# Patient Record
Sex: FEMALE | Race: WHITE | URBAN - METROPOLITAN AREA
[De-identification: names, ages, dates, MRNs, and addresses within clinical notes are randomized per-mention and may not be internally consistent; named-entity substitution may affect disease eponyms.]

---

## 2021-01-25 ENCOUNTER — TELEPHONE (OUTPATIENT)
Dept: GASTROENTEROLOGY | Age: 71
End: 2021-01-25

## 2021-02-02 ENCOUNTER — TELEPHONE (OUTPATIENT)
Dept: GASTROENTEROLOGY | Age: 71
End: 2021-02-02

## 2021-02-02 NOTE — TELEPHONE ENCOUNTER
Writer spoke to pt over the phone in regards to resched 2/9/21 appt due to provider's proc sched. Pt is asked to resched to 2/10/21 @ 12:45pm at the [facility]. Pt states she is able to take the new appt date & time.

## 2021-02-08 PROBLEM — K21.9 GASTROESOPHAGEAL REFLUX DISEASE WITHOUT ESOPHAGITIS: Status: ACTIVE | Noted: 2021-02-08

## 2021-02-08 RX ORDER — EZETIMIBE 10 MG/1
TABLET ORAL
COMMUNITY
Start: 2020-12-18

## 2021-02-08 RX ORDER — PIRFENIDONE 267 MG/1
TABLET, COATED ORAL
COMMUNITY
Start: 2020-12-18 | End: 2021-04-13

## 2021-02-08 RX ORDER — METFORMIN HYDROCHLORIDE 500 MG/1
TABLET, EXTENDED RELEASE ORAL
COMMUNITY
Start: 2020-11-17

## 2021-02-08 RX ORDER — ZOLPIDEM TARTRATE 10 MG/1
TABLET ORAL
COMMUNITY
Start: 2021-01-20 | End: 2021-04-13

## 2021-02-08 RX ORDER — METOPROLOL SUCCINATE 25 MG/1
TABLET, EXTENDED RELEASE ORAL
COMMUNITY
Start: 2020-11-05

## 2021-02-08 RX ORDER — HUMAN INSULIN 100 [IU]/ML
INJECTION, SUSPENSION SUBCUTANEOUS
COMMUNITY
Start: 2020-12-18

## 2021-02-08 RX ORDER — CLOPIDOGREL BISULFATE 75 MG/1
TABLET ORAL
COMMUNITY
Start: 2020-12-10

## 2021-02-08 RX ORDER — FUROSEMIDE 20 MG/1
TABLET ORAL
COMMUNITY
Start: 2020-12-10

## 2021-02-08 RX ORDER — PANTOPRAZOLE SODIUM 40 MG/1
TABLET, DELAYED RELEASE ORAL
COMMUNITY
Start: 2021-01-15

## 2021-02-10 ENCOUNTER — OFFICE VISIT (OUTPATIENT)
Dept: GASTROENTEROLOGY | Age: 71
End: 2021-02-10
Payer: MEDICARE

## 2021-02-10 VITALS
TEMPERATURE: 97.3 F | DIASTOLIC BLOOD PRESSURE: 63 MMHG | HEIGHT: 63 IN | HEART RATE: 82 BPM | SYSTOLIC BLOOD PRESSURE: 112 MMHG | RESPIRATION RATE: 18 BRPM | BODY MASS INDEX: 23.21 KG/M2 | WEIGHT: 131 LBS

## 2021-02-10 DIAGNOSIS — K21.9 GASTROESOPHAGEAL REFLUX DISEASE WITHOUT ESOPHAGITIS: ICD-10-CM

## 2021-02-10 DIAGNOSIS — R10.13 ABDOMINAL PAIN, EPIGASTRIC: Primary | ICD-10-CM

## 2021-02-10 PROCEDURE — 99204 OFFICE O/P NEW MOD 45 MIN: CPT | Performed by: INTERNAL MEDICINE

## 2021-02-10 ASSESSMENT — ENCOUNTER SYMPTOMS
ABDOMINAL DISTENTION: 0
DIARRHEA: 1
ABDOMINAL PAIN: 0
COUGH: 0
WHEEZING: 0
VOMITING: 0
ANAL BLEEDING: 0
NAUSEA: 0
RECTAL PAIN: 0
CONSTIPATION: 0
CHOKING: 0
TROUBLE SWALLOWING: 0
BLOOD IN STOOL: 0

## 2021-02-10 NOTE — PROGRESS NOTES
Reason for Referral:   Mallory Echeverria., MD  [address] [place] [address]    Chief Complaint   Patient presents with    Gastroesophageal Reflux     Patient is new, referred for GERD. Patient states she thinks her symptoms are med related. She states she was having abd pain and gurggling after eating. She states since stopping the medication, her pain has subsided. HISTORY OF PRESENT ILLNESS: Ms.Bonnie Saba Skaggs is a 66 y.o. female , referred for evaluation of*abd pain , GERD, s/p robbie, questionable ulcerative colitis  Here for first time   Her pulmonary doctor from the [facility], wants her to have an EGD rule out peptic ulcer disease rule out reflux  As when she was following with them she was started on medication  Esbriet  , for Pulm fibrosis, and when she was on that medication she was having severe symptoms including abdominal pain nausea heartburn all of those symptoms have subsided after the medication was removed but she needs to be scoped so she can be started back on it according to what she is telling me.   The patient denied any bleeding denied any odynophagia or dysphagia denied any fever or chills  She does have diarrhea once or twice a week only for years, she had history of ulcerative colitis but she has not been following with any gastroenterologist she had no medication for it for at least 40 years for the past she used to take sulfasalazine seem like, she is not interested in investigating that and she made it multiple times clearly that she does not want to be on medication she does not want to be scoped for it and this is not what she is here for today she keeps saying  Other than that the patient denied any other symptoms denied any weight loss and review of system otherwise with her unremarkable  Try to review her imaging and medications and labs on [facility] in [facility] both   No recent labs her labs are all from 2016 2017        past Medical,Family, and Social History reviewed and does contribute to the patient presentingcondition. Patient's PMH/PSH,SH,PSYCH Hx, MEDs, ALLERGIES, and ROS were all reviewed and updated in the appropriate sections. PAST MEDICAL HISTORY:  Past Medical History:   Diagnosis Date    Diabetes mellitus ([address]     Mitral valve prolapse        Past Surgical History:   Procedure Laterality Date    ADENOIDECTOMY AND TYMPANOSTOMY TUBE PLACEMENT      BLADDER REPAIR      CHOLECYSTECTOMY      HYSTERECTOMY         CURRENT MEDICATIONS:    Current Outpatient Medications:     clopidogrel (PLAVIX) 75 MG tablet, TAKE 1 TABLET BY MOUTH ONCE DAILY, Disp: , Rfl:     ezetimibe (ZETIA) 10 MG tablet, TAKE 1 TABLET BY MOUTH ONCE DAILY, Disp: , Rfl:     furosemide (LASIX) 20 MG tablet, TAKE ONE TABLET BY MOUTH AS NEEDED FOR EDEMA. NO MORE THAN TWO A WEEK, Disp: , Rfl:     NOVOLIN N 100 UNIT/ML injection vial, INJECT 15 UNITS SUBCUTANEOUSLY IN THE MORNING AND IN THE EVENING, Disp: , Rfl:     metFORMIN (GLUCOPHAGE-XR) 500 MG extended release tablet, TAKE 2 TABLETS BY MOUTH TWICE DAILY, Disp: , Rfl:     metoprolol succinate (TOPROL XL) 25 MG extended release tablet, TAKE 2 TABLETS BY MOUTH EVERY DAY AT BEDTIME, Disp: , Rfl:     pantoprazole (PROTONIX) 40 MG tablet, , Disp: , Rfl:     ESBRIET 267 MG TABS, , Disp: , Rfl:     zolpidem (AMBIEN) 10 MG tablet, , Disp: , Rfl:     nadolol (CORGARD) 80 MG tablet, Take 120 mg by mouth daily. , Disp: , Rfl:     Insulin Aspart (NOVOLOG FLEXPEN SC), Inject  into the skin.  Indications: Sliding Scale, Disp: , Rfl:     ALLERGIES:   Allergies   Allergen Reactions    Aspirin Other (See Comments)     Stomach Cramps       FAMILY HISTORY:       Problem Relation Age of Onset    Diabetes Mother     Stroke Mother     Esophageal Cancer Father     Heart Disease Father          SOCIAL HISTORY:   Social History     Socioeconomic History    Marital status:      Spouse name: Not on file    Number of children: Not on file    Years of education: Not on file    Highest education level: Not on file   Occupational History    Not on file   Social Needs    Financial resource strain: Not on file    Food insecurity     Worry: Not on file     Inability: Not on file    Transportation needs     Medical: Not on file     Non-medical: Not on file   Tobacco Use    Smoking status: Never Smoker    Smokeless tobacco: Never Used   Substance and Sexual Activity    Alcohol use: No    Drug use: No    Sexual activity: Not on file   Lifestyle    Physical activity     Days per week: Not on file     Minutes per session: Not on file    Stress: Not on file   Relationships    Social connections     Talks on phone: Not on file     Gets together: Not on file     Attends Anabaptism service: Not on file     Active member of club or organization: Not on file     Attends meetings of clubs or organizations: Not on file     Relationship status: Not on file    Intimate partner violence     Fear of current or ex partner: Not on file     Emotionally abused: Not on file     Physically abused: Not on file     Forced sexual activity: Not on file   Other Topics Concern    Not on file   Social History Narrative    Not on file       REVIEW OF SYSTEMS: A 12-point review of systemswas obtained and pertinent positives and negatives were enumerated above in the history of present illness. All other reviewed systems / symptoms were negative. Review of Systems   Constitutional: Negative for appetite change, fatigue and unexpected weight change. HENT: Negative for trouble swallowing. Eyes: Positive for visual disturbance (glasses). Respiratory: Negative for cough, choking and wheezing. Cardiovascular: Positive for palpitations. Negative for chest pain and leg swelling. Gastrointestinal: Positive for diarrhea (colitis).  Negative for abdominal distention, abdominal pain, anal bleeding, blood in stool, constipation, nausea, rectal pain and vomiting. Genitourinary: Negative for difficulty urinating. Allergic/Immunologic: Negative for environmental allergies and food allergies. Neurological: Negative for dizziness, weakness, light-headedness, numbness and headaches. Hematological: Bruises/bleeds easily. Psychiatric/Behavioral: Positive for sleep disturbance. The patient is not nervous/anxious. LABORATORY DATA: Reviewed  Lab Results   Component Value Date    WBC 9.4 08/09/2016    HGB 8.9 (L) 08/09/2016    HCT 28.5 (L) 08/09/2016    MCV 96.9 08/09/2016     08/09/2016     08/09/2016    K 3.9 08/09/2016     08/09/2016    CO2 25 08/09/2016    BUN 11 08/09/2016    CREATININE 0.71 08/09/2016         Lab Results   Component Value Date    RBC 2.94 (L) 08/09/2016    HGB 8.9 (L) 08/09/2016    MCV 96.9 08/09/2016    MCH 30.4 08/09/2016    MCHC 31.4 08/09/2016    RDW 15.1 08/09/2016    MPV 10.3 08/09/2016    BASOPCT 0 08/09/2016    LYMPHSABS 2.00 08/09/2016    MONOSABS 0.50 08/09/2016    NEUTROABS 6.00 08/09/2016    EOSABS 0.90 (H) 08/09/2016    BASOSABS 0.00 08/09/2016         DIAGNOSTIC TESTING:     No results found. /63   Pulse 82   Temp 97.3 °F (36.3 °C)   Resp 18   Ht 5' 3\" (1.6 m)   Wt 131 lb (59.4 kg)   BMI 23.21 kg/m²     PHYSICAL EXAMINATION: Vital signs reviewed per the nursing documentation. Body mass index is 23.21 kg/m². Physical Exam  Vitals signs and nursing note reviewed. Constitutional:       General: She is not in acute distress. Appearance: She is well-developed. She is not diaphoretic. HENT:      Head: Normocephalic. Mouth/Throat:      Pharynx: No oropharyngeal exudate. Eyes:      General: No scleral icterus. Pupils: Pupils are equal, round, and reactive to light. Neck:      Musculoskeletal: Normal range of motion and neck supple. Thyroid: No thyromegaly. Vascular: No JVD. Trachea: No tracheal deviation. Cardiovascular:      Rate and Rhythm: Normal rate and regular rhythm. Heart sounds: Normal heart sounds. No murmur. Pulmonary:      Effort: Pulmonary effort is normal. No respiratory distress. Breath sounds: Normal breath sounds. No wheezing. Abdominal:      General: Bowel sounds are normal. There is no distension. Palpations: Abdomen is soft. Tenderness: There is no abdominal tenderness. There is no guarding or rebound. Comments: No ascites   Musculoskeletal: Normal range of motion. Skin:     General: Skin is warm. Coloration: Skin is not pale. Findings: No erythema or rash. Comments: She is not diaphoretic   Neurological:      Mental Status: She is alert and oriented to person, place, and time. Deep Tendon Reflexes: Reflexes are normal and symmetric. Psychiatric:         Behavior: Behavior normal.         Thought Content: Thought content normal.         Judgment: Judgment normal.         IMPRESSION: Ms. Yves Srinivasan is a 66 y.o. female with      Diagnosis Orders   1. Abdominal pain, epigastric  EGD   2.  Gastroesophageal reflux disease without esophagitis  EGD     This patient is not interested in any further testing for any other GI issues she just want the EGD so she can be started back on her medication  Try to explain to her ulcerative colitis and the need to scope her and make sure she does not have any cancer or any activation of the disease after all those years of no medication no follow-up  The patient is very adamant about what she wants she does not want to be treated for else of colitis she does want to be investigated for it all what she wants in her EGD to see if she had any abnormality in the upper GI tract  She is currently on Protonix and I told her at least to continue with that for now      Diet/life style/natural hx /complication of the dx were all explained in details   Past medical, past surgical, social history, psychiatric history, medications or allergies, all reviewed and  updated    Spent 45 minutes providing patient education and counseling. Thank you for allowing me to participate in the care of Ms. Jamison. For any further questions please do not hesitate to contact me. I have reviewed and agree with the MA/RN ROS. Note is dictated utilizing voice recognition software. Unfortunately this leads to occasional typographical errors. Please contact our office if you have any questions.     Raúl Ocasio MD  [facility]  O: #[phone]

## 2021-02-11 NOTE — TELEPHONE ENCOUNTER
Writer spoke to pt over the phone informing pt she has been sched for covid testing at [facility] 3/8/21 @ 10:40am. Pt instructed where to report. Pt voices her understanding.

## 2021-02-17 NOTE — TELEPHONE ENCOUNTER
Plavix and Aspirin clearance received and scanned. lvm for [facility] to inform her of clearance to hold Plavix and ASA 5 days prior to procedure, beginning 03/07/21.

## 2021-03-08 ENCOUNTER — HOSPITAL ENCOUNTER (OUTPATIENT)
Dept: LAB | Age: 71
Setting detail: SPECIMEN
Discharge: HOME | End: 2021-03-08
Payer: MEDICARE

## 2021-03-08 DIAGNOSIS — Z01.818 PREOP TESTING: Primary | ICD-10-CM

## 2021-03-08 LAB
SARS-COV-2: NORMAL
SARS-COV-2: NOT DETECTED
SOURCE: NORMAL

## 2021-03-08 PROCEDURE — U0003 INFECTIOUS AGENT DETECTION BY NUCLEIC ACID (DNA OR RNA); SEVERE ACUTE RESPIRATORY SYNDROME CORONAVIRUS 2 (SARS-COV-2) (CORONAVIRUS DISEASE [COVID-19]), AMPLIFIED PROBE TECHNIQUE, MAKING USE OF HIGH THROUGHPUT TECHNOLOGIES AS DESCRIBED BY CMS-2020-01-R: HCPCS

## 2021-03-08 PROCEDURE — U0005 INFEC AGEN DETEC AMPLI PROBE: HCPCS

## 2021-03-09 ENCOUNTER — TELEPHONE (OUTPATIENT)
Dept: PRIMARY CARE CLINIC | Age: 71
End: 2021-03-09

## 2021-03-11 ENCOUNTER — ANESTHESIA EVENT (OUTPATIENT)
Dept: OPERATING ROOM | Age: 71
End: 2021-03-11
Payer: MEDICARE

## 2021-03-12 ENCOUNTER — ANESTHESIA (OUTPATIENT)
Dept: OPERATING ROOM | Age: 71
End: 2021-03-12
Payer: MEDICARE

## 2021-03-12 ENCOUNTER — HOSPITAL ENCOUNTER (OUTPATIENT)
Age: 71
Setting detail: OUTPATIENT SURGERY
Discharge: HOME | End: 2021-03-12
Attending: INTERNAL MEDICINE | Admitting: INTERNAL MEDICINE
Payer: MEDICARE

## 2021-03-12 VITALS
SYSTOLIC BLOOD PRESSURE: 119 MMHG | HEIGHT: 62 IN | DIASTOLIC BLOOD PRESSURE: 91 MMHG | HEART RATE: 88 BPM | TEMPERATURE: 97.7 F | OXYGEN SATURATION: 99 % | WEIGHT: 125 LBS | RESPIRATION RATE: 19 BRPM | BODY MASS INDEX: 23 KG/M2

## 2021-03-12 VITALS — DIASTOLIC BLOOD PRESSURE: 60 MMHG | SYSTOLIC BLOOD PRESSURE: 137 MMHG | OXYGEN SATURATION: 98 %

## 2021-03-12 LAB
GLUCOSE BLD-MCNC: 197 MG/DL (ref 65–105)
GLUCOSE BLD-MCNC: 209 MG/DL (ref 65–105)

## 2021-03-12 PROCEDURE — 7100000010 HC PHASE II RECOVERY - FIRST 15 MIN: Performed by: INTERNAL MEDICINE

## 2021-03-12 PROCEDURE — 2580000003 HC RX 258: Performed by: NURSE ANESTHETIST, CERTIFIED REGISTERED

## 2021-03-12 PROCEDURE — 2500000003 HC RX 250 WO HCPCS: Performed by: NURSE ANESTHETIST, CERTIFIED REGISTERED

## 2021-03-12 PROCEDURE — 3609012400 HC EGD TRANSORAL BIOPSY SINGLE/MULTIPLE: Performed by: INTERNAL MEDICINE

## 2021-03-12 PROCEDURE — 6360000002 HC RX W HCPCS: Performed by: NURSE ANESTHETIST, CERTIFIED REGISTERED

## 2021-03-12 PROCEDURE — 3700000000 HC ANESTHESIA ATTENDED CARE: Performed by: INTERNAL MEDICINE

## 2021-03-12 PROCEDURE — 43239 EGD BIOPSY SINGLE/MULTIPLE: CPT | Performed by: INTERNAL MEDICINE

## 2021-03-12 PROCEDURE — 2709999900 HC NON-CHARGEABLE SUPPLY: Performed by: INTERNAL MEDICINE

## 2021-03-12 PROCEDURE — 88305 TISSUE EXAM BY PATHOLOGIST: CPT

## 2021-03-12 PROCEDURE — 2580000003 HC RX 258: Performed by: ANESTHESIOLOGY

## 2021-03-12 PROCEDURE — 82947 ASSAY GLUCOSE BLOOD QUANT: CPT

## 2021-03-12 PROCEDURE — 7100000011 HC PHASE II RECOVERY - ADDTL 15 MIN: Performed by: INTERNAL MEDICINE

## 2021-03-12 RX ORDER — PROMETHAZINE HYDROCHLORIDE 25 MG/ML
6.25 INJECTION, SOLUTION INTRAMUSCULAR; INTRAVENOUS
Status: DISCONTINUED | OUTPATIENT
Start: 2021-03-12 | End: 2021-03-12 | Stop reason: HOSPADM

## 2021-03-12 RX ORDER — HYDROMORPHONE HCL 110MG/55ML
0.5 PATIENT CONTROLLED ANALGESIA SYRINGE INTRAVENOUS EVERY 5 MIN PRN
Status: DISCONTINUED | OUTPATIENT
Start: 2021-03-12 | End: 2021-03-12 | Stop reason: HOSPADM

## 2021-03-12 RX ORDER — HYDRALAZINE HYDROCHLORIDE 20 MG/ML
5 INJECTION INTRAMUSCULAR; INTRAVENOUS EVERY 10 MIN PRN
Status: DISCONTINUED | OUTPATIENT
Start: 2021-03-12 | End: 2021-03-12 | Stop reason: HOSPADM

## 2021-03-12 RX ORDER — PROPOFOL 10 MG/ML
INJECTION, EMULSION INTRAVENOUS PRN
Status: DISCONTINUED | OUTPATIENT
Start: 2021-03-12 | End: 2021-03-12 | Stop reason: SDUPTHER

## 2021-03-12 RX ORDER — HYDROMORPHONE HCL 110MG/55ML
0.25 PATIENT CONTROLLED ANALGESIA SYRINGE INTRAVENOUS EVERY 5 MIN PRN
Status: DISCONTINUED | OUTPATIENT
Start: 2021-03-12 | End: 2021-03-12 | Stop reason: HOSPADM

## 2021-03-12 RX ORDER — SODIUM CHLORIDE 0.9 % (FLUSH) 0.9 %
10 SYRINGE (ML) INJECTION EVERY 12 HOURS SCHEDULED
Status: DISCONTINUED | OUTPATIENT
Start: 2021-03-12 | End: 2021-03-12 | Stop reason: HOSPADM

## 2021-03-12 RX ORDER — METOCLOPRAMIDE HYDROCHLORIDE 5 MG/ML
10 INJECTION INTRAMUSCULAR; INTRAVENOUS
Status: DISCONTINUED | OUTPATIENT
Start: 2021-03-12 | End: 2021-03-12 | Stop reason: HOSPADM

## 2021-03-12 RX ORDER — MEPERIDINE HYDROCHLORIDE 50 MG/ML
12.5 INJECTION INTRAMUSCULAR; INTRAVENOUS; SUBCUTANEOUS EVERY 5 MIN PRN
Status: DISCONTINUED | OUTPATIENT
Start: 2021-03-12 | End: 2021-03-12 | Stop reason: HOSPADM

## 2021-03-12 RX ORDER — LIDOCAINE HYDROCHLORIDE 20 MG/ML
INJECTION, SOLUTION INFILTRATION; PERINEURAL PRN
Status: DISCONTINUED | OUTPATIENT
Start: 2021-03-12 | End: 2021-03-12 | Stop reason: SDUPTHER

## 2021-03-12 RX ORDER — SODIUM CHLORIDE 9 MG/ML
INJECTION, SOLUTION INTRAVENOUS CONTINUOUS
Status: DISCONTINUED | OUTPATIENT
Start: 2021-03-13 | End: 2021-03-12

## 2021-03-12 RX ORDER — LIDOCAINE HYDROCHLORIDE 10 MG/ML
1 INJECTION, SOLUTION EPIDURAL; INFILTRATION; INTRACAUDAL; PERINEURAL
Status: DISCONTINUED | OUTPATIENT
Start: 2021-03-13 | End: 2021-03-12 | Stop reason: HOSPADM

## 2021-03-12 RX ORDER — DIPHENHYDRAMINE HYDROCHLORIDE 50 MG/ML
12.5 INJECTION INTRAMUSCULAR; INTRAVENOUS
Status: DISCONTINUED | OUTPATIENT
Start: 2021-03-12 | End: 2021-03-12 | Stop reason: HOSPADM

## 2021-03-12 RX ORDER — SODIUM CHLORIDE, SODIUM LACTATE, POTASSIUM CHLORIDE, CALCIUM CHLORIDE 600; 310; 30; 20 MG/100ML; MG/100ML; MG/100ML; MG/100ML
INJECTION, SOLUTION INTRAVENOUS CONTINUOUS PRN
Status: DISCONTINUED | OUTPATIENT
Start: 2021-03-12 | End: 2021-03-12 | Stop reason: SDUPTHER

## 2021-03-12 RX ORDER — SODIUM CHLORIDE, SODIUM LACTATE, POTASSIUM CHLORIDE, CALCIUM CHLORIDE 600; 310; 30; 20 MG/100ML; MG/100ML; MG/100ML; MG/100ML
INJECTION, SOLUTION INTRAVENOUS CONTINUOUS
Status: DISCONTINUED | OUTPATIENT
Start: 2021-03-13 | End: 2021-03-12 | Stop reason: HOSPADM

## 2021-03-12 RX ORDER — HYDROCODONE BITARTRATE AND ACETAMINOPHEN 5; 325 MG/1; MG/1
1 TABLET ORAL
Status: DISCONTINUED | OUTPATIENT
Start: 2021-03-12 | End: 2021-03-12 | Stop reason: HOSPADM

## 2021-03-12 RX ORDER — SODIUM CHLORIDE 0.9 % (FLUSH) 0.9 %
10 SYRINGE (ML) INJECTION PRN
Status: DISCONTINUED | OUTPATIENT
Start: 2021-03-12 | End: 2021-03-12 | Stop reason: HOSPADM

## 2021-03-12 RX ADMIN — LIDOCAINE HYDROCHLORIDE 60 MG: 20 INJECTION, SOLUTION INFILTRATION; PERINEURAL at 09:42

## 2021-03-12 RX ADMIN — SODIUM CHLORIDE, POTASSIUM CHLORIDE, SODIUM LACTATE AND CALCIUM CHLORIDE: 600; 310; 30; 20 INJECTION, SOLUTION INTRAVENOUS at 09:34

## 2021-03-12 RX ADMIN — PROPOFOL 70 MG: 10 INJECTION, EMULSION INTRAVENOUS at 09:42

## 2021-03-12 RX ADMIN — SODIUM CHLORIDE, POTASSIUM CHLORIDE, SODIUM LACTATE AND CALCIUM CHLORIDE: 600; 310; 30; 20 INJECTION, SOLUTION INTRAVENOUS at 08:50

## 2021-03-12 ASSESSMENT — PULMONARY FUNCTION TESTS
PIF_VALUE: 1

## 2021-03-12 ASSESSMENT — PAIN SCALES - GENERAL: PAINLEVEL_OUTOF10: 0

## 2021-03-12 NOTE — ANESTHESIA POSTPROCEDURE EVALUATION
POST- ANESTHESIA EVALUATION       Pt Name: Shiloh Berg  MRN: [id number]  YOB: 1950  Date of evaluation: 3/12/2021  Time:  10:57 AM      BP (!) 119/91   Pulse 88   Temp 97.7 °F (36.5 °C) (Temporal)   Resp 19   Ht 5' 2\" (1.575 m)   Wt 125 lb (56.7 kg)   SpO2 99%   BMI 22.86 kg/m²      Consciousness Level  Awake  Cardiopulmonary Status  Stable  Pain Adequately Treated YES  Nausea / Vomiting  NO  Adequate Hydration  YES  Anesthesia Related Complications NONE      Electronically signed by Lilian Bradley MD on 3/12/2021 at 10:57 AM       Department of Anesthesiology  Postprocedure Note    Patient: Shiloh Berg  MRN: [id number]  YOB: 1950  Date of evaluation: 3/12/2021  Time:  10:57 AM     Procedure Summary     Date: 03/12/21 Room / Location: [place] [address]     Anesthesia Start: 0877 Anesthesia Stop: 9392    Procedure: EGD BIOPSY (N/A ) Diagnosis: (DX EPIGASTRIC PAIN, GERD)    Surgeons: Amanda Arrieta MD Responsible Provider: Lilian Bradley MD    Anesthesia Type: general, MAC ASA Status: 3          Anesthesia Type: general, MAC    Ken Phase I: Ken Score: 10    Ken Phase II: Ken Score: 8    Last vitals: Reviewed and per EMR flowsheets.        Anesthesia Post Evaluation

## 2021-03-12 NOTE — ANESTHESIA PRE PROCEDURE
Department of Anesthesiology  Preprocedure Note       Name:  Deann Jeans   Age:  66 y.o.  :  1950                                          MRN:  [id number]         Date:  3/12/2021      Surgeon: Ladan Arrington):  Wenceslao Velasquez MD    Procedure: Procedure(s):  EGD ESOPHAGOGASTRODUODENOSCOPY    Medications prior to admission:   Prior to Admission medications    Medication Sig Start Date End Date Taking? Authorizing Provider   Pseudoeph-Doxylamine-DM-APAP (NYQUIL PO) Take by mouth   Yes Historical Provider, MD   clopidogrel (PLAVIX) 75 MG tablet TAKE 1 TABLET BY MOUTH ONCE DAILY 12/10/20  Yes Historical Provider, MD   ezetimibe (ZETIA) 10 MG tablet TAKE 1 TABLET BY MOUTH ONCE DAILY 20  Yes Historical Provider, MD   furosemide (LASIX) 20 MG tablet TAKE ONE TABLET BY MOUTH AS NEEDED FOR EDEMA. NO MORE THAN TWO A WEEK 12/10/20  Yes Historical Provider, MD   NOVOLIN N 100 UNIT/ML injection vial INJECT 15 UNITS SUBCUTANEOUSLY IN THE MORNING AND IN THE EVENING 20  Yes Historical Provider, MD   metFORMIN (GLUCOPHAGE-XR) 500 MG extended release tablet TAKE 2 TABLETS BY MOUTH TWICE DAILY 20  Yes Historical Provider, MD   metoprolol succinate (TOPROL XL) 25 MG extended release tablet TAKE 2 TABLETS BY MOUTH EVERY DAY AT BEDTIME 20  Yes Historical Provider, MD   pantoprazole (PROTONIX) 40 MG tablet  1/15/21  Yes Historical Provider, MD   ESBRIET 267 MG TABS  20  Yes Historical Provider, MD   Insulin Aspart (NOVOLOG FLEXPEN SC) Inject  into the skin.  Indications: Sliding Scale   Yes Historical Provider, MD   zolpidem (AMBIEN) 10 MG tablet  21   Historical Provider, MD       Current medications:    Current Facility-Administered Medications   Medication Dose Route Frequency Provider Last Rate Last Admin    [START ON 3/13/2021] 0.9 % sodium chloride infusion   Intravenous Continuous Brenden Sullivan DO        [START ON 3/13/2021] lactated ringers infusion   Intravenous Continuous Brenden PLASCENCIA Porsha Costello, DO        sodium chloride flush 0.9 % injection 10 mL  10 mL Intravenous 2 times per day Brenden Sullivan, DO        sodium chloride flush 0.9 % injection 10 mL  10 mL Intravenous PRN Je Brownlee Park, DO        [START ON 3/13/2021] lidocaine PF 1 % injection 1 mL  1 mL Intradermal Once PRN Je Brownlee Park, DO           Allergies: Allergies   Allergen Reactions    Aspirin Other (See Comments)     Stomach Cramps       Problem List:    Patient Active Problem List   Diagnosis Code    Gastroesophageal reflux disease without esophagitis K21.9       Past Medical History:        Diagnosis Date    Diabetes mellitus ([facility] [address]     Mitral valve prolapse        Past Surgical History:        Procedure Laterality Date    ADENOIDECTOMY AND TYMPANOSTOMY TUBE PLACEMENT      BLADDER REPAIR      CHOLECYSTECTOMY      HYSTERECTOMY         Social History:    Social History     Tobacco Use    Smoking status: Never Smoker    Smokeless tobacco: Never Used   Substance Use Topics    Alcohol use: No                                Counseling given: Not Answered      Vital Signs (Current):   Vitals:    03/12/21 0823 03/12/21 0824   BP:  128/62   Pulse:  91   Resp:  14   Temp:  96.9 °F (36.1 °C)   TempSrc:  Temporal   SpO2:  98%   Weight: 125 lb (56.7 kg)    Height: 5' 2\" (1.575 m)                                               BP Readings from Last 3 Encounters:   03/12/21 128/62   02/10/21 112/63       NPO Status:                                                                                 BMI:   Wt Readings from Last 3 Encounters:   03/12/21 125 lb (56.7 kg)   02/10/21 131 lb (59.4 kg)     Body mass index is 22.86 kg/m².     CBC:   Lab Results   Component Value Date    WBC 9.4 08/09/2016    RBC 2.94 08/09/2016    HGB 8.9 08/09/2016    HCT 28.5 08/09/2016    MCV 96.9 08/09/2016    RDW 15.1 08/09/2016     08/09/2016       CMP:   Lab Results   Component Value Date     08/09/2016    K 3.9 08/09/2016  08/09/2016    CO2 25 08/09/2016    BUN 11 08/09/2016    CREATININE 0.71 08/09/2016    GFRAA >60 08/09/2016    LABGLOM >60 08/09/2016    GLUCOSE 62 08/09/2016    CALCIUM 8.6 08/09/2016       POC Tests: No results for input(s): POCGLU, POCNA, POCK, POCCL, POCBUN, POCHEMO, POCHCT in the last 72 hours. Coags: No results found for: PROTIME, INR, APTT    HCG (If Applicable): No results found for: PREGTESTUR, PREGSERUM, HCG, HCGQUANT     ABGs: No results found for: PHART, PO2ART, ISL6ROW, UPX0JLO, BEART, R7DDBICC     Type & Screen (If Applicable):  No results found for: LABABO, LABRH    Drug/Infectious Status (If Applicable):  No results found for: HIV, HEPCAB    COVID-19 Screening (If Applicable):   Lab Results   Component Value Date    COVID19 Not Detected 03/08/2021         Anesthesia Evaluation  Patient summary reviewed and Nursing notes reviewed no history of anesthetic complications:   Airway: Mallampati: I  TM distance: >3 FB   Neck ROM: limited  Mouth opening: > = 3 FB Dental: normal exam         Pulmonary:normal exam                              ROS comment: Pulmonary fibrosis   Cardiovascular:    (+) valvular problems/murmurs: MVP,                   Neuro/Psych:   Negative Neuro/Psych ROS              GI/Hepatic/Renal:   (+) GERD:,           Endo/Other:    (+) DiabetesType II DM, using insulin, . Abdominal:           Vascular:                                        Anesthesia Plan      MAC     ASA 3       Induction: intravenous. MIPS: Postoperative opioids intended and Prophylactic antiemetics administered. Anesthetic plan and risks discussed with patient. Plan discussed with CRNA.                   Jane Bernabe MD   3/12/2021

## 2021-03-12 NOTE — H&P
History and Physical Update    Pt Name: Homar Moses  MRN: [id number]  YOB: 1950  Date of evaluation: 3/12/2021      [x] I have reviewed the Progress Note by Dr Zhen Coyne dated 2/10/21 in epic which meets the criteria for an Interval History and Physical note and is attached below. [x] I have examined  Homar Moses  There are no changes to the patient who is scheduled for a EGD by Dr Zhen Coyne for GERD and epigastric pain. The patient denies new health changes, fever, chills, wheezing, cough, increased SOB, chest pain, open sores or wounds. +DM POC        Hx CAD S/p 2016 CABG x4 with stenting x2 shortly after open heart according to patient . Patient on dual anticoagulant  Last Plavix and ASA 81mg 43/7/21 Follows with Cardiology at [address] [place] [address] signs: /62   Pulse 91   Temp 96.9 °F (36.1 °C) (Temporal)   Resp 14   Ht 5' 2\" (1.575 m)   Wt 125 lb (56.7 kg)   SpO2 98%   BMI 22.86 kg/m²     Allergies:  Aspirin    Medications:    Prior to Admission medications    Medication Sig Start Date End Date Taking? Authorizing Provider   Pseudoeprafa-Doxylamine-DM-APAP (NYQUIL PO) Take by mouth   Yes Historical Provider, MD   clopidogrel (PLAVIX) 75 MG tablet TAKE 1 TABLET BY MOUTH ONCE DAILY 12/10/20  Yes Historical Provider, MD   ezetimibe (ZETIA) 10 MG tablet TAKE 1 TABLET BY MOUTH ONCE DAILY 12/18/20  Yes Historical Provider, MD   furosemide (LASIX) 20 MG tablet TAKE ONE TABLET BY MOUTH AS NEEDED FOR EDEMA.  NO MORE THAN TWO A WEEK 12/10/20  Yes Historical Provider, MD   NOVOLIN N 100 UNIT/ML injection vial INJECT 15 UNITS SUBCUTANEOUSLY IN THE MORNING AND IN THE EVENING 12/18/20  Yes Historical Provider, MD   metFORMIN (GLUCOPHAGE-XR) 500 MG extended release tablet TAKE 2 TABLETS BY MOUTH TWICE DAILY 11/17/20  Yes Historical Provider, MD   metoprolol succinate (TOPROL XL) 25 MG extended release tablet TAKE 2 TABLETS BY MOUTH EVERY DAY AT BEDTIME 11/5/20  Yes Historical Provider, MD pantoprazole (PROTONIX) 40 MG tablet  1/15/21  Yes Historical Provider, MD   ESBRIET 267 MG TABS  12/18/20  Yes Historical Provider, MD   Insulin Aspart (NOVOLOG FLEXPEN SC) Inject  into the skin. Indications: Sliding Scale   Yes Historical Provider, MD   zolpidem (AMBIEN) 10 MG tablet  1/20/21   Historical Provider, MD         This is a 66 y.o. female who is pleasant, cooperative, alert and oriented x3, in no acute distress. Heart: Heart sounds are normal.  HR 91 regular rate and rhythm without murmur, gallop or rub. Lungs: Normal respiratory effort with equal expansion, mildly diminished bilateral bases with scattered crackle,  unlabored w/o use of accessory muscles, wheezes or rales bilaterally   Abdomen: soft, nontender, nondistended with bowel sounds . Labs:  No results for input(s): HGB, HCT, WBC, MCV, PLT, NA, K, CL, CO2, BUN, CREATININE, GLUCOSE, INR, PROTIME, APTT, AST, ALT, LABALBU, HCG in the last 720 hours. Recent Labs     03/08/21  [address] Not Detected             Anthony ASTUDILLO, ANP-BC  Electronically signed 3/12/2021 at Jamila Last MD   Physician   Specialty:  Gastroenterology   Progress Notes   Signed   Encounter Date:  2/10/2021         Related encounter: Office Visit from 2/10/2021 in [address]             Reason for Referral:   Amy Sawant MD  [address], [address], [address]          Chief Complaint   Patient presents with    Gastroesophageal Reflux       Patient is new, referred for GERD. Patient states she thinks her symptoms are med related. She states she was having abd pain and gurggling after eating. She states since stopping the medication, her pain has subsided.                HISTORY OF PRESENT ILLNESS: Ms.Bonnie Jesús Mills is a 66 y.o. female , referred for evaluation of*abd pain , GERD, s/p robbie, questionable ulcerative colitis  Here for first time   Her pulmonary doctor from the [facility], wants her to have an EGD rule out peptic ulcer disease rule out reflux  As when she was following with them she was started on medication  Esbriet  , for Pulm fibrosis, and when she was on that medication she was having severe symptoms including abdominal pain nausea heartburn all of those symptoms have subsided after the medication was removed but she needs to be scoped so she can be started back on it according to what she is telling me. The patient denied any bleeding denied any odynophagia or dysphagia denied any fever or chills  She does have diarrhea once or twice a week only for years, she had history of ulcerative colitis but she has not been following with any gastroenterologist she had no medication for it for at least 40 years for the past she used to take sulfasalazine seem like, she is not interested in investigating that and she made it multiple times clearly that she does not want to be on medication she does not want to be scoped for it and this is not what she is here for today she keeps saying  Other than that the patient denied any other symptoms denied any weight loss and review of system otherwise with her unremarkable  Try to review her imaging and medications and labs on epic in [facility] both   No recent labs her labs are all from 2016 2017           past Medical,Family, and Social History reviewed and does contribute to the patient presentingcondition. Patient's PMH/PSH,SH,PSYCH Hx, MEDs, ALLERGIES, and ROS were all reviewed and updated in the appropriate sections.      PAST MEDICAL HISTORY:  Past Medical History        Past Medical History:   Diagnosis Date    Diabetes mellitus ([address]      Mitral valve prolapse              Past Surgical History         Past Surgical History:   Procedure Laterality Date    ADENOIDECTOMY AND TYMPANOSTOMY TUBE PLACEMENT        BLADDER REPAIR        CHOLECYSTECTOMY        Smoker    Smokeless tobacco: Never Used   Substance and Sexual Activity    Alcohol use: No    Drug use: No    Sexual activity: Not on file   Lifestyle    Physical activity       Days per week: Not on file       Minutes per session: Not on file    Stress: Not on file   Relationships    Social connections       Talks on phone: Not on file       Gets together: Not on file       Attends Judaism service: Not on file       Active member of club or organization: Not on file       Attends meetings of clubs or organizations: Not on file       Relationship status: Not on file    Intimate partner violence       Fear of current or ex partner: Not on file       Emotionally abused: Not on file       Physically abused: Not on file       Forced sexual activity: Not on file   Other Topics Concern    Not on file   Social History Narrative    Not on file            REVIEW OF SYSTEMS: A 12-point review of systemswas obtained and pertinent positives and negatives were enumerated above in the history of present illness. All other reviewed systems / symptoms were negative. Review of Systems   Constitutional: Negative for appetite change, fatigue and unexpected weight change. HENT: Negative for trouble swallowing. Eyes: Positive for visual disturbance (glasses). Respiratory: Negative for cough, choking and wheezing. Cardiovascular: Positive for palpitations. Negative for chest pain and leg swelling. Gastrointestinal: Positive for diarrhea (colitis). Negative for abdominal distention, abdominal pain, anal bleeding, blood in stool, constipation, nausea, rectal pain and vomiting. Genitourinary: Negative for difficulty urinating. Allergic/Immunologic: Negative for environmental allergies and food allergies. Neurological: Negative for dizziness, weakness, light-headedness, numbness and headaches. Hematological: Bruises/bleeds easily. Psychiatric/Behavioral: Positive for sleep disturbance.  The patient is not recognition software. Unfortunately this leads to occasional typographical errors. Please contact our office if you have any questions.      Naty Fontaine MD  [facility]  O: #[phone]            Revision History

## 2021-03-12 NOTE — OP NOTE
d/c when criteria is met . Findings:    Retropharyngeal area was grossly normal appearing    Esophagus: normal    Stomach:    Fundus:Gastritis biopsy was taken     Body: abnormal: Gastritis biopsy was taken     Antrum: abnormal: Gastritis biopsy was taken     Duodenum:     Descending: normal    Bulb: normal    The scope was removed and the patient tolerated the procedure well. Recommendations/Plan:   1. F/U Biopsies  2. F/U In Office in 3-4 weeks  3.  Discussed with the family    Electronically signed by Bonifacio Arenas MD  on 3/12/2021 at 9:50 AM

## 2021-03-15 LAB — SURGICAL PATHOLOGY REPORT: NORMAL

## 2021-04-13 ENCOUNTER — OFFICE VISIT (OUTPATIENT)
Dept: GASTROENTEROLOGY | Age: 71
End: 2021-04-13
Payer: MEDICARE

## 2021-04-13 VITALS
TEMPERATURE: 97.5 F | RESPIRATION RATE: 18 BRPM | WEIGHT: 128 LBS | BODY MASS INDEX: 23.55 KG/M2 | SYSTOLIC BLOOD PRESSURE: 101 MMHG | DIASTOLIC BLOOD PRESSURE: 62 MMHG | HEIGHT: 62 IN | HEART RATE: 86 BPM

## 2021-04-13 DIAGNOSIS — R10.13 ABDOMINAL PAIN, EPIGASTRIC: ICD-10-CM

## 2021-04-13 DIAGNOSIS — Z87.19 HX OF CROHN'S DISEASE: ICD-10-CM

## 2021-04-13 DIAGNOSIS — K21.9 GASTROESOPHAGEAL REFLUX DISEASE WITHOUT ESOPHAGITIS: Primary | ICD-10-CM

## 2021-04-13 PROCEDURE — G8400 PT W/DXA NO RESULTS DOC: HCPCS | Performed by: INTERNAL MEDICINE

## 2021-04-13 PROCEDURE — 99214 OFFICE O/P EST MOD 30 MIN: CPT | Performed by: INTERNAL MEDICINE

## 2021-04-13 PROCEDURE — 1123F ACP DISCUSS/DSCN MKR DOCD: CPT | Performed by: INTERNAL MEDICINE

## 2021-04-13 PROCEDURE — 4040F PNEUMOC VAC/ADMIN/RCVD: CPT | Performed by: INTERNAL MEDICINE

## 2021-04-13 PROCEDURE — G8427 DOCREV CUR MEDS BY ELIG CLIN: HCPCS | Performed by: INTERNAL MEDICINE

## 2021-04-13 PROCEDURE — 1036F TOBACCO NON-USER: CPT | Performed by: INTERNAL MEDICINE

## 2021-04-13 PROCEDURE — 3017F COLORECTAL CA SCREEN DOC REV: CPT | Performed by: INTERNAL MEDICINE

## 2021-04-13 PROCEDURE — 1090F PRES/ABSN URINE INCON ASSESS: CPT | Performed by: INTERNAL MEDICINE

## 2021-04-13 PROCEDURE — G8420 CALC BMI NORM PARAMETERS: HCPCS | Performed by: INTERNAL MEDICINE

## 2021-04-13 RX ORDER — CHOLECALCIFEROL (VITAMIN D3) 25 MCG
CAPSULE ORAL
COMMUNITY

## 2021-04-13 RX ORDER — ASPIRIN 81 MG/1
81 TABLET ORAL DAILY
COMMUNITY

## 2021-04-13 ASSESSMENT — ENCOUNTER SYMPTOMS
RECTAL PAIN: 0
ABDOMINAL DISTENTION: 0
ANAL BLEEDING: 0
VOMITING: 0
COUGH: 0
NAUSEA: 0
ABDOMINAL PAIN: 0
WHEEZING: 0
BLOOD IN STOOL: 0
DIARRHEA: 1
CONSTIPATION: 0
CHOKING: 0
TROUBLE SWALLOWING: 0

## 2021-04-13 NOTE — PROGRESS NOTES
GI CLINIC FOLLOW UP    INTERVAL HISTORY:   No referring provider defined for this encounter. Chief Complaint   Patient presents with    Abdominal Pain     Patient is f/u on abd pain and EGD. She states she is no longer having the pain. HISTORY OF PRESENT ILLNESS: Ms.Bonnie Lorena Carrillo is a 66 y.o. female , referred for evaluation of*abd pain , GERD, s/p robbie, questionable ulcerative colitis      Here for f/u    seen last visit with above   egd was done   She had gastritis biopsies negative patient is doing very well she said her abdominal pain is almost gone  She does have diarrhea but the same like she has all her life    She is again insisting on the fact that she is not having a flareup of IBD and does not want to the IBD to be checked or treated at this time    she had history of ulcerative colitis but she has not been following with any gastroenterologist she had no medication for it for at least 40 years for the past she used to take sulfasalazine seem like, she is not interested in investigating that and she made it multiple times clearly that she does not want to be on medication she does not want to be scoped for it     DATE OF PROCEDURE: 3/12/2021      SURGEON: Jesus Ann MD  Facility: [facility]  ASSISTANT: None  Anesthesia: mac   PREOPERATIVE DIAGNOSIS:   GERD  Abdominal pain     Diagnosis:  Gastritis biopsy was taken       POSTOPERATIVE DIAGNOSIS: As described below     OPERATION: Upper GI endoscopy with Biopsy     ANESTHESIA: Moderate Sedation      ESTIMATED BLOOD LOSS: Less than 50 ml     COMPLICATIONS: None.      SPECIMENS:  Was Obtained:   Gastritis biopsy was taken      HISTORY: The patient is a 66y.o. year old female with history of above preop diagnosis. I recommended esophagogastroduodenoscopy with possible biopsy and I explained the risk, benefits, expected outcome, and alternatives to the procedure.   Risks included but are not limited to bleeding, infection, respiratory distress, hypotension, and perforation of the esophagus, stomach, or duodenum. Patient understands and is in agreement.      The patient was counseled at length about the risks of saman Covid-19 during their perioperative period and any recovery window from their procedure.  The patient was made aware that saman Covid-19  may worsen their prognosis for recovering from their procedure  and lend to a higher morbidity and/or mortality risk.  All material risks, benefits, and reasonable alternatives including postponing the procedure were discussed. The patient does wish to proceed with the procedure at this time.      PROCEDURE: The patient was given IV conscious sedation. The patient's SPO2 remained above 90% throughout the procedure. The gastroscope was inserted orally and advanced under direct vision through the esophagus, through the stomach, through the pylorus, and into the descending duodenum.       Post sedation note : The patient's SPO2 remained above 90% throughout the procedure. the vital signs remained stable , and no immediate complication form the procedure noted, patient will be ready for d/c when criteria is met .      Findings:     Retropharyngeal area was grossly normal appearing     Esophagus: normal     Stomach:    Fundus:Gastritis biopsy was taken     Body: abnormal: Gastritis biopsy was taken     Antrum: abnormal: Gastritis biopsy was taken      Duodenum:     Descending: normal    Bulb: normal     The scope was removed and the patient tolerated the procedure well.      Recommendations/Plan:   1. F/U Biopsies  2. F/U In Office in 3-4 weeks  3. Discussed with the family     Electronically signed by James Gibbs MD  on 3/12/2021 at 9:50 AM   -- Diagnosis --   STOMACH, ANTRUM, BIOPSIES:- MILD CHRONIC GASTRITIS.- NEGATIVE FOR   HELICOBACTER PYLORI INFECTION. Past Medical,Family, and Social History reviewed and does contribute to the patient presentingcondition.     Patient's PMH/PSH,SH,PSYCH Hx, MEDs, ALLERGIES, and ROS were all reviewed and updated in the appropriate sections. PAST MEDICAL HISTORY:  Past Medical History:   Diagnosis Date    Diabetes mellitus ([address]     Mitral valve prolapse     Pulmonary fibrosis (HCC)     Tachycardia        Past Surgical History:   Procedure Laterality Date    ADENOIDECTOMY AND TYMPANOSTOMY TUBE PLACEMENT      BLADDER REPAIR      CHOLECYSTECTOMY      HYSTERECTOMY      TONSILLECTOMY      UPPER GASTROINTESTINAL ENDOSCOPY N/A 3/12/2021    EGD BIOPSY performed by Germán Green MD at [facility]:    Current Outpatient Medications:     aspirin 81 MG EC tablet, Take 81 mg by mouth daily, Disp: , Rfl:     Cholecalciferol (VITAMIN D-3) 25 MCG (1000 UT) CAPS, Take by mouth, Disp: , Rfl:     Pseudoeph-Doxylamine-DM-APAP (NYQUIL PO), Take by mouth, Disp: , Rfl:     clopidogrel (PLAVIX) 75 MG tablet, TAKE 1 TABLET BY MOUTH ONCE DAILY, Disp: , Rfl:     ezetimibe (ZETIA) 10 MG tablet, TAKE 1 TABLET BY MOUTH ONCE DAILY, Disp: , Rfl:     furosemide (LASIX) 20 MG tablet, TAKE ONE TABLET BY MOUTH AS NEEDED FOR EDEMA. NO MORE THAN TWO A WEEK, Disp: , Rfl:     NOVOLIN N 100 UNIT/ML injection vial, INJECT 15 UNITS SUBCUTANEOUSLY IN THE MORNING AND IN THE EVENING, Disp: , Rfl:     metFORMIN (GLUCOPHAGE-XR) 500 MG extended release tablet, TAKE 2 TABLETS BY MOUTH TWICE DAILY, Disp: , Rfl:     metoprolol succinate (TOPROL XL) 25 MG extended release tablet, TAKE 2 TABLETS BY MOUTH EVERY DAY AT BEDTIME, Disp: , Rfl:     pantoprazole (PROTONIX) 40 MG tablet, , Disp: , Rfl:     Insulin Aspart (NOVOLOG FLEXPEN SC), Inject  into the skin.  Indications: Sliding Scale, Disp: , Rfl:     ALLERGIES:   Allergies   Allergen Reactions    Aspirin Other (See Comments)     Stomach Cramps    Statins Other (See Comments)     Muscle spasm on higher dose         FAMILY HISTORY:       Problem Relation Age of Onset    Diabetes Mother     Stroke Mother  Esophageal Cancer Father     Heart Disease Father          SOCIAL HISTORY:   Social History     Socioeconomic History    Marital status:      Spouse name: Not on file    Number of children: Not on file    Years of education: Not on file    Highest education level: Not on file   Occupational History    Not on file   Social Needs    Financial resource strain: Not on file    Food insecurity     Worry: Not on file     Inability: Not on file    Transportation needs     Medical: Not on file     Non-medical: Not on file   Tobacco Use    Smoking status: Never Smoker    Smokeless tobacco: Never Used   Substance and Sexual Activity    Alcohol use: No    Drug use: No    Sexual activity: Not on file   Lifestyle    Physical activity     Days per week: Not on file     Minutes per session: Not on file    Stress: Not on file   Relationships    Social connections     Talks on phone: Not on file     Gets together: Not on file     Attends Sabianist service: Not on file     Active member of club or organization: Not on file     Attends meetings of clubs or organizations: Not on file     Relationship status: Not on file    Intimate partner violence     Fear of current or ex partner: Not on file     Emotionally abused: Not on file     Physically abused: Not on file     Forced sexual activity: Not on file   Other Topics Concern    Not on file   Social History Narrative    Not on file       REVIEW OF SYSTEMS: A 12-point review of systemswas obtained and pertinent positives and negatives were enumerated above in the history of present illness. All other reviewed systems / symptoms were negative. Review of Systems   Constitutional: Negative for appetite change, fatigue and unexpected weight change. HENT: Negative for trouble swallowing. Eyes: Positive for visual disturbance (glasses). Respiratory: Negative for cough, choking and wheezing. Cardiovascular: Positive for palpitations.  Negative for chest pain and leg swelling. Gastrointestinal: Positive for diarrhea (colitis). Negative for abdominal distention, abdominal pain, anal bleeding, blood in stool, constipation, nausea, rectal pain and vomiting. Genitourinary: Negative for difficulty urinating. Allergic/Immunologic: Negative for environmental allergies and food allergies. Neurological: Negative for dizziness, weakness, light-headedness, numbness and headaches. Hematological: Bruises/bleeds easily. Psychiatric/Behavioral: Positive for sleep disturbance. The patient is not nervous/anxious. LABORATORY DATA: Reviewed  Lab Results   Component Value Date    WBC 9.4 08/09/2016    HGB 8.9 (L) 08/09/2016    HCT 28.5 (L) 08/09/2016    MCV 96.9 08/09/2016     08/09/2016     08/09/2016    K 3.9 08/09/2016     08/09/2016    CO2 25 08/09/2016    BUN 11 08/09/2016    CREATININE 0.71 08/09/2016         Lab Results   Component Value Date    RBC 2.94 (L) 08/09/2016    HGB 8.9 (L) 08/09/2016    MCV 96.9 08/09/2016    MCH 30.4 08/09/2016    MCHC 31.4 08/09/2016    RDW 15.1 08/09/2016    MPV 10.3 08/09/2016    BASOPCT 0 08/09/2016    LYMPHSABS 2.00 08/09/2016    MONOSABS 0.50 08/09/2016    NEUTROABS 6.00 08/09/2016    EOSABS 0.90 (H) 08/09/2016    BASOSABS 0.00 08/09/2016         DIAGNOSTIC TESTING:     No results found. PHYSICAL EXAMINATION: Vital signs reviewed per the nursing documentation. /62   Pulse 86   Temp 97.5 °F (36.4 °C)   Resp 18   Ht 5' 2\" (1.575 m)   Wt 128 lb (58.1 kg)   BMI 23.41 kg/m²   Body mass index is 23.41 kg/m². Physical Exam  Vitals signs and nursing note reviewed. Constitutional:       General: She is not in acute distress. Appearance: She is well-developed. She is not diaphoretic. HENT:      Head: Normocephalic. Mouth/Throat:      Pharynx: No oropharyngeal exudate. Eyes:      General: No scleral icterus. Pupils: Pupils are equal, round, and reactive to light. Neck:      Musculoskeletal: Normal range of motion and neck supple. Thyroid: No thyromegaly. Vascular: No JVD. Trachea: No tracheal deviation. Cardiovascular:      Rate and Rhythm: Normal rate and regular rhythm. Heart sounds: Normal heart sounds. No murmur. Pulmonary:      Effort: Pulmonary effort is normal. No respiratory distress. Breath sounds: Normal breath sounds. No wheezing. Abdominal:      General: Bowel sounds are normal. There is no distension. Palpations: Abdomen is soft. Tenderness: There is no abdominal tenderness. There is no guarding or rebound. Comments: No ascites   Musculoskeletal: Normal range of motion. Skin:     General: Skin is warm. Coloration: Skin is not pale. Findings: No erythema or rash. Comments: She is not diaphoretic   Neurological:      Mental Status: She is alert and oriented to person, place, and time. Deep Tendon Reflexes: Reflexes are normal and symmetric. Psychiatric:         Behavior: Behavior normal.         Thought Content: Thought content normal.         Judgment: Judgment normal.           IMPRESSION: Ms. Jeffrey Coffey is a 66 y.o. female with      Diagnosis Orders   1. Gastroesophageal reflux disease without esophagitis     2. Abdominal pain, epigastric     3. Hx of Crohn's disease  CBC Auto Differential    Comp Metabolic w Bili Profile    Vitamin B12    Iron and TIBC    Folate     Patient insisting on no investigation or treatment for IBD at this time she just wanted the stomach to be checked and was checked and doing very well    Biopsies negative and the patient is asymptomatic at this time we will continue with Protonix      Diet/life style/natural hx /complication of the dx were all explained in details   Past medical, past surgical, social history, psychiatric history, medications or allergies, all reviewed and  updated    Thank you for allowing me to participate in the care of Ms. Jamison.  For any further questions please do not hesitate to contact me. I have reviewed and agree with the ROS entered by the MA/RN. Note is dictated utilizing voice recognition software. Unfortunately this leads to occasional typographical errors. Please contact our office if you have any questions.       Cristobal Heimlich, MD  [facility]  O: #[phone]

## (undated) DEVICE — JELLY,LUBE,STERILE,FLIP TOP,TUBE,2-OZ: Brand: MEDLINE

## (undated) DEVICE — BLOCK BITE 60FR RUBBER ADLT DENTAL

## (undated) DEVICE — Device: Brand: DEFENDO VALVE AND CONNECTOR KIT

## (undated) DEVICE — BASIN EMSIS 700ML GRAPHITE PLAS KID SHP GRAD

## (undated) DEVICE — GAUZE,SPONGE,4"X4",16PLY,STRL,LF,10/TRAY: Brand: MEDLINE

## (undated) DEVICE — CONMED DISPOSABLE GASTROINTESTINAL CYTOLOGY BRUSH, STRAIGHT HANDLE, 2.5 MM X 160 CM: Brand: CONMED

## (undated) DEVICE — CUP MED 1OZ CLR POLYPR FEED GRAD W/O LID

## (undated) DEVICE — MEDICINE CUP, GRADUATED, STER: Brand: MEDLINE

## (undated) DEVICE — CO2 CANNULA,SUPERSOFT, ADLT,7'O2,7'CO2: Brand: MEDLINE

## (undated) DEVICE — ADAPTER TBNG LUER STUB 15 GA INTMED